# Patient Record
Sex: MALE | Race: WHITE | NOT HISPANIC OR LATINO | Employment: FULL TIME | URBAN - NONMETROPOLITAN AREA
[De-identification: names, ages, dates, MRNs, and addresses within clinical notes are randomized per-mention and may not be internally consistent; named-entity substitution may affect disease eponyms.]

---

## 2017-08-01 ENCOUNTER — TRANSCRIBE ORDERS (OUTPATIENT)
Dept: ADMINISTRATIVE | Facility: HOSPITAL | Age: 47
End: 2017-08-01

## 2017-08-01 DIAGNOSIS — G83.4: Primary | ICD-10-CM

## 2017-08-01 DIAGNOSIS — G95.9 UNSPECIFIED DISEASE OF SPINAL CORD: ICD-10-CM

## 2017-08-08 ENCOUNTER — HOSPITAL ENCOUNTER (OUTPATIENT)
Dept: MRI IMAGING | Facility: HOSPITAL | Age: 47
Discharge: HOME | End: 2017-08-08
Admitting: NURSE PRACTITIONER

## 2017-08-08 ENCOUNTER — HOSPITAL ENCOUNTER (OUTPATIENT)
Dept: MRI IMAGING | Facility: HOSPITAL | Age: 47
Discharge: HOME | End: 2017-08-08

## 2017-08-08 DIAGNOSIS — G95.9 UNSPECIFIED DISEASE OF SPINAL CORD: ICD-10-CM

## 2017-08-08 DIAGNOSIS — G83.4: ICD-10-CM

## 2017-08-08 LAB — CREAT BLDA-MCNC: 1.1 MG/DL (ref 0.6–1.3)

## 2017-08-08 PROCEDURE — 72157 MRI CHEST SPINE W/O & W/DYE: CPT

## 2017-08-08 PROCEDURE — 72158 MRI LUMBAR SPINE W/O & W/DYE: CPT

## 2017-08-08 PROCEDURE — 0 GADOBENATE DIMEGLUMINE 529 MG/ML SOLUTION: Performed by: NURSE PRACTITIONER

## 2017-08-08 PROCEDURE — A9577 INJ MULTIHANCE: HCPCS | Performed by: NURSE PRACTITIONER

## 2017-08-08 PROCEDURE — 82565 ASSAY OF CREATININE: CPT

## 2017-08-08 RX ADMIN — GADOBENATE DIMEGLUMINE 20 ML: 529 INJECTION, SOLUTION INTRAVENOUS at 13:45

## 2019-06-29 ENCOUNTER — HOSPITAL ENCOUNTER (OUTPATIENT)
Dept: GENERAL RADIOLOGY | Facility: HOSPITAL | Age: 49
Discharge: HOME | End: 2019-06-29

## 2019-06-29 ENCOUNTER — HOSPITAL ENCOUNTER (OUTPATIENT)
Dept: GENERAL RADIOLOGY | Facility: HOSPITAL | Age: 49
Discharge: HOME | End: 2019-06-29
Admitting: NURSE PRACTITIONER

## 2019-06-29 DIAGNOSIS — M25.571 ACUTE RIGHT ANKLE PAIN: ICD-10-CM

## 2019-06-29 PROCEDURE — 73610 X-RAY EXAM OF ANKLE: CPT

## 2019-06-29 PROCEDURE — 73630 X-RAY EXAM OF FOOT: CPT

## 2020-08-13 PROCEDURE — U0003 INFECTIOUS AGENT DETECTION BY NUCLEIC ACID (DNA OR RNA); SEVERE ACUTE RESPIRATORY SYNDROME CORONAVIRUS 2 (SARS-COV-2) (CORONAVIRUS DISEASE [COVID-19]), AMPLIFIED PROBE TECHNIQUE, MAKING USE OF HIGH THROUGHPUT TECHNOLOGIES AS DESCRIBED BY CMS-2020-01-R: HCPCS | Performed by: NURSE PRACTITIONER

## 2020-12-16 ENCOUNTER — HOSPITAL ENCOUNTER (OUTPATIENT)
Dept: GENERAL RADIOLOGY | Facility: HOSPITAL | Age: 50
Discharge: HOME | End: 2020-12-16

## 2020-12-16 PROCEDURE — 72110 X-RAY EXAM L-2 SPINE 4/>VWS: CPT

## 2020-12-16 PROCEDURE — 74018 RADEX ABDOMEN 1 VIEW: CPT

## 2021-01-08 PROCEDURE — 87636 SARSCOV2 & INF A&B AMP PRB: CPT | Performed by: NURSE PRACTITIONER

## 2025-01-27 ENCOUNTER — OFFICE VISIT (OUTPATIENT)
Dept: SURGERY | Facility: CLINIC | Age: 55
End: 2025-01-27
Payer: COMMERCIAL

## 2025-01-27 ENCOUNTER — PATIENT ROUNDING (BHMG ONLY) (OUTPATIENT)
Dept: SURGERY | Facility: CLINIC | Age: 55
End: 2025-01-27
Payer: COMMERCIAL

## 2025-01-27 VITALS
WEIGHT: 250.6 LBS | HEART RATE: 74 BPM | DIASTOLIC BLOOD PRESSURE: 82 MMHG | HEIGHT: 73 IN | SYSTOLIC BLOOD PRESSURE: 130 MMHG | BODY MASS INDEX: 33.21 KG/M2 | OXYGEN SATURATION: 97 %

## 2025-01-27 DIAGNOSIS — N50.89 TESTICLE SWELLING: ICD-10-CM

## 2025-01-27 DIAGNOSIS — E66.09 CLASS 1 OBESITY DUE TO EXCESS CALORIES WITH BODY MASS INDEX (BMI) OF 33.0 TO 33.9 IN ADULT, UNSPECIFIED WHETHER SERIOUS COMORBIDITY PRESENT: ICD-10-CM

## 2025-01-27 DIAGNOSIS — E66.811 CLASS 1 OBESITY DUE TO EXCESS CALORIES WITH BODY MASS INDEX (BMI) OF 33.0 TO 33.9 IN ADULT, UNSPECIFIED WHETHER SERIOUS COMORBIDITY PRESENT: ICD-10-CM

## 2025-01-27 DIAGNOSIS — K40.90 RIGHT INGUINAL HERNIA: Primary | ICD-10-CM

## 2025-01-27 PROCEDURE — 99204 OFFICE O/P NEW MOD 45 MIN: CPT | Performed by: STUDENT IN AN ORGANIZED HEALTH CARE EDUCATION/TRAINING PROGRAM

## 2025-01-27 NOTE — PATIENT INSTRUCTIONS

## 2025-01-27 NOTE — PROGRESS NOTES
Office New Patient History and Physical:     Referring Provider: Kwasi Aponte AP*    Chief Complaint   Patient presents with    Hernia       Subjective .     History of present illness:    History of Present Illness  The patient presents for evaluation of an inguinal hernia.    He first observed the inguinal hernia a few weeks ago during a shower. The hernia, located in the middle of his groin area, extending up to his leg, does not cause him discomfort or pain. However, it does cause mild irritation when he engages in running activities. 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 He reports no testicular swelling. His medical history includes an open left inguinal hernia repair with mesh, performed approximately 10 years ago. He reports no other abdominal surgeries and is not currently on any anticoagulant therapy.    SOCIAL HISTORY  He does not smoke.       History  Past Medical History:   Diagnosis Date    Anxiety     Herniated disc, cervical     Hyperlipemia     Hyperlipidemia     Hypertension     Spinal cord cysts    ,   Past Surgical History:   Procedure Laterality Date    INGUINAL HERNIA REPAIR     ,   Family History   Problem Relation Age of Onset    GI problems Mother     Lupus Mother     Neuropathy Mother     Hypertension Mother     No Known Problems Sister     No Known Problems Brother     No Known Problems Sister     No Known Problems Daughter     No Known Problems Daughter     No Known Problems Daughter    ,   Social History     Tobacco Use    Smoking status: Never     Passive exposure: Never    Smokeless tobacco: Never   Vaping Use    Vaping status: Never Used   Substance Use Topics    Alcohol use: No    Drug use: No   , (Not in a hospital admission)   and Allergies:  Patient has no known allergies.    Current Outpatient Medications:     escitalopram (LEXAPRO) 20 MG tablet, Take 1 tablet by mouth Daily., Disp: , Rfl:     metoprolol succinate XL (TOPROL-XL) 50 MG 24 hr  "tablet, Take 1 tablet by mouth Daily., Disp: , Rfl:     rOPINIRole (REQUIP) 0.25 MG tablet, Take 1 tablet by mouth every night at bedtime. (Patient not taking: Reported on 1/27/2025), Disp: , Rfl:         Objective     Vital Signs   /82   Pulse 74   Ht 185.4 cm (73\")   Wt 114 kg (250 lb 9.6 oz)   SpO2 97%   BMI 33.06 kg/m²      Physical Exam:  General appearance - alert, well appearing, and in no distress  Mental status - alert, oriented to person, place, and time  Eyes - pupils equal and reactive, extraocular eye movements intact  Neck - supple, no significant adenopathy  Abdomen - soft, nontender, nondistended, no masses or organomegaly  + small right inguinal hernia. Testicular swelling.   Neurological - alert, oriented, normal speech, no focal findings or movement disorder noted  Physical Exam         Results Review:     The following data was reviewed by: Jessica Tolentino MD on 01/27/2025:  REFERRAL/PRE-AUTH MRN - SCAN - Referral from Dr. Kwasi Aponte (01/20/2025)   GENERAL SURGERY - SCAN - CT ABD/PELVIS - [facility] (01/20/2025)     Assessment & Plan       Diagnoses and all orders for this visit:    1. Right inguinal hernia (Primary)    2. Testicle swelling  -     US Scrotum & Testicles; Future  -     Ambulatory Referral to Urology    3. Class 1 obesity due to excess calories with body mass index (BMI) of 33.0 to 33.9 in adult, unspecified whether serious comorbidity present       Assessment & Plan  1. Right inguinal hernia.  A small fat-containing right inguinal hernia was identified on the CT scan. The hernia is not causing significant discomfort but does irritate slightly during physical activity.  If surgical intervention is required, the right inguinal hernia will be repaired robotically through small incisions during the same procedure as any potential urological procedure to minimize anesthesia exposure.    2. Potential hydrocele.  The patient reported a sensation in the testicle area, " which could be a hydrocele or fluid accumulation. The CT scan indicated some abnormalities in the testicle area. An ultrasound of the testicles will be conducted to confirm the diagnosis. The patient will see JAIME Machuca, for further evaluation after the ultrasound. If the ultrasound confirms a hydrocele, surgical options will be considered by urology.     PROCEDURE  The patient underwent an open left inguinal hernia repair with mesh approximately 10 years ago.     This is a chronic problem (inguinal hernia) and an undiagnosed problem with an uncertain prognosis (testicular swelling). I have reviewed the note above, ordered a referral to urology and an US. I discussed the case with JAIME Machuca.     BMI is >= 30 and <35. (Class 1 Obesity). The following options were offered after discussion;: weight loss educational material (shared in after visit summary)      Jessica Tolentino MD  01/27/25  12:54 CST      Patient or patient representative verbalized consent for the use of Ambient Listening during the visit with  Jessica Tolentino MD for chart documentation. 1/27/2025  12:54 CST

## 2025-01-31 ENCOUNTER — HOSPITAL ENCOUNTER (OUTPATIENT)
Dept: ULTRASOUND IMAGING | Facility: HOSPITAL | Age: 55
Discharge: HOME | End: 2025-01-31
Admitting: STUDENT IN AN ORGANIZED HEALTH CARE EDUCATION/TRAINING PROGRAM
Payer: COMMERCIAL

## 2025-01-31 DIAGNOSIS — N50.89 TESTICLE SWELLING: ICD-10-CM

## 2025-01-31 PROCEDURE — 76870 US EXAM SCROTUM: CPT

## 2025-02-13 NOTE — PROGRESS NOTES
Subjective    Mr. Wood is 54 y.o. male    Chief Complaint: Testicle cyst    History of Present Illness  Patient is a 54-year-old gentleman who was having swelling that showed a right inguinal fat-containing hernia he saw Dr. Tolentino general surgeon and hernia repair was discussed patient then had ultrasound of the scrotum to rule out any scrotal or urologic abnormalities.  The ultrasound showed a left intratesticular cyst and small right hydrocele.  Patient denies any pain or discomfort and has opted not to do anything unless he needs to.    The following portions of the patient's history were reviewed and updated as appropriate: allergies, current medications, past family history, past medical history, past social history, past surgical history and problem list.    Review of Systems   Genitourinary:  Positive for scrotal swelling.         Current Outpatient Medications:     escitalopram (LEXAPRO) 20 MG tablet, Take 1 tablet by mouth Daily., Disp: , Rfl:     metoprolol succinate XL (TOPROL-XL) 50 MG 24 hr tablet, Take 1 tablet by mouth Daily., Disp: , Rfl:     rOPINIRole (REQUIP) 0.25 MG tablet, Take 1 tablet by mouth every night at bedtime. (Patient not taking: Reported on 2/25/2025), Disp: , Rfl:     Past Medical History:   Diagnosis Date    Anxiety     Herniated disc, cervical     Hyperlipemia     Hyperlipidemia     Hypertension     Spinal cord cysts        Past Surgical History:   Procedure Laterality Date    INGUINAL HERNIA REPAIR         Social History     Socioeconomic History    Marital status:    Tobacco Use    Smoking status: Never     Passive exposure: Never    Smokeless tobacco: Never   Vaping Use    Vaping status: Never Used   Substance and Sexual Activity    Alcohol use: No    Drug use: No    Sexual activity: Defer       Family History   Problem Relation Age of Onset    GI problems Mother     Lupus Mother     Neuropathy Mother     Hypertension Mother     No Known Problems Sister     No Known  "Problems Brother     No Known Problems Sister     No Known Problems Daughter     No Known Problems Daughter     No Known Problems Daughter        Objective    Temp 97.7 °F (36.5 °C)   Ht 185.4 cm (73\")   Wt 113 kg (249 lb 9.6 oz)   BMI 32.93 kg/m²     Physical Exam  Constitutional:       Appearance: Normal appearance.   HENT:      Head: Normocephalic and atraumatic.   Pulmonary:      Effort: Pulmonary effort is normal.   Genitourinary:     Testes: Normal.   Skin:     Coloration: Skin is not pale.   Neurological:      Mental Status: He is alert.   Psychiatric:         Mood and Affect: Mood normal.         Behavior: Behavior normal.             Results for orders placed or performed in visit on 02/25/25   POC Urinalysis Dipstick, Multipro    Collection Time: 02/25/25  2:27 PM    Specimen: Urine   Result Value Ref Range    Color Yellow Yellow, Straw, Dark Yellow, Lisset    Clarity, UA Clear Clear    Glucose, UA Negative Negative mg/dL    Bilirubin Negative Negative    Ketones, UA Negative Negative    Specific Gravity  1.030 1.005 - 1.030    Blood, UA Trace (A) Negative    pH, Urine 6.0 5.0 - 8.0    Protein, POC 30 mg/dL (A) Negative mg/dL    Urobilinogen, UA 1 E.U./dL (A) Normal, 0.2 E.U./dL    Nitrite, UA Negative Negative    Leukocytes Negative Negative     Scrotal ultrasound independent review:  I inspected both testicles for any intratesticular masses there is a 5 mm what appears to be intratesticular cyst on the left side.  Right testicle is normal no evidence of infection or torsion.  Small and of fluid around the right testicle.  Assessment and Plan    Diagnoses and all orders for this visit:    1. Groin swelling (Primary)  -     POC Urinalysis Dipstick, Multipro    2. Testicular cyst  -     US Scrotum & Testicles; Future      Patient right inguinal hernia he had seen Dr. Tolentino general surgeon who then had patient get a scrotal ultrasound.  I would like to repeat ultrasound in 3 months to document stability " of the left intratesticular cyst.  No other acute findings I see nothing from a urology standpoint that would indicate a require surgery.

## 2025-02-25 ENCOUNTER — OFFICE VISIT (OUTPATIENT)
Dept: UROLOGY | Facility: CLINIC | Age: 55
End: 2025-02-25
Payer: COMMERCIAL

## 2025-02-25 VITALS — HEIGHT: 73 IN | BODY MASS INDEX: 33.08 KG/M2 | TEMPERATURE: 97.7 F | WEIGHT: 249.6 LBS

## 2025-02-25 DIAGNOSIS — R19.09 GROIN SWELLING: Primary | ICD-10-CM

## 2025-02-25 DIAGNOSIS — N44.2 TESTICULAR CYST: ICD-10-CM

## 2025-02-25 LAB
BILIRUB BLD-MCNC: NEGATIVE MG/DL
CLARITY, POC: CLEAR
COLOR UR: YELLOW
GLUCOSE UR STRIP-MCNC: NEGATIVE MG/DL
KETONES UR QL: NEGATIVE
LEUKOCYTE EST, POC: NEGATIVE
NITRITE UR-MCNC: NEGATIVE MG/ML
PH UR: 6 [PH] (ref 5–8)
PROT UR STRIP-MCNC: ABNORMAL MG/DL
RBC # UR STRIP: ABNORMAL /UL
SP GR UR: 1.03 (ref 1–1.03)
UROBILINOGEN UR QL: ABNORMAL

## 2025-05-28 ENCOUNTER — HOSPITAL ENCOUNTER (OUTPATIENT)
Dept: ULTRASOUND IMAGING | Facility: HOSPITAL | Age: 55
Discharge: HOME | End: 2025-05-28
Admitting: PHYSICIAN ASSISTANT
Payer: COMMERCIAL

## 2025-05-28 DIAGNOSIS — N44.2 TESTICULAR CYST: ICD-10-CM

## 2025-05-28 PROCEDURE — 76870 US EXAM SCROTUM: CPT

## 2025-05-28 NOTE — PROGRESS NOTES
Subjective    Mr. Wood is 54 y.o. male    Chief Complaint: Groin Swelling    History of Present Illness  Patient was referred to us by Dr. Tolentino due to scrotal ultrasound findings he was being evaluated for right inguinal hernia.  The ultrasound showed a intratesticular cyst on the left.  Bilateral epididymal cysts.  I examined him and after reviewing the first ultrasound did not see anything that looked like a testicular mass but I wanted him to return today with ultrasound to assess for stability. For ultrasound findings see below.  Patient also has had some right testicular discomfort and what he perceives as groin swelling.    The following portions of the patient's history were reviewed and updated as appropriate: allergies, current medications, past family history, past medical history, past social history, past surgical history and problem list.    Review of Systems   Genitourinary:  Positive for scrotal swelling and testicular pain.         Current Outpatient Medications:     escitalopram (LEXAPRO) 20 MG tablet, Take 1 tablet by mouth Daily., Disp: , Rfl:     metoprolol succinate XL (TOPROL-XL) 50 MG 24 hr tablet, Take 1 tablet by mouth Daily., Disp: , Rfl:     rOPINIRole (REQUIP) 0.25 MG tablet, Take 1 tablet by mouth every night at bedtime., Disp: , Rfl:     naproxen (Naprosyn) 500 MG tablet, Take 1 tablet by mouth 2 (Two) Times a Day With Meals for 20 days., Disp: 40 tablet, Rfl: 0    Past Medical History:   Diagnosis Date    Anxiety     Herniated disc, cervical     Hyperlipemia     Hyperlipidemia     Hypertension     Spinal cord cysts        Past Surgical History:   Procedure Laterality Date    INGUINAL HERNIA REPAIR         Social History     Socioeconomic History    Marital status:    Tobacco Use    Smoking status: Never     Passive exposure: Never    Smokeless tobacco: Never   Vaping Use    Vaping status: Never Used   Substance and Sexual Activity    Alcohol use: No    Drug use: No     "Sexual activity: Defer       Family History   Problem Relation Age of Onset    GI problems Mother     Lupus Mother     Neuropathy Mother     Hypertension Mother     No Known Problems Sister     No Known Problems Brother     No Known Problems Sister     No Known Problems Daughter     No Known Problems Daughter     No Known Problems Daughter        Objective    Temp 97.2 °F (36.2 °C)   Ht 185.4 cm (73\")   Wt 114 kg (251 lb 9.6 oz)   BMI 33.19 kg/m²     Physical Exam  Constitutional:       Appearance: Normal appearance.   HENT:      Head: Normocephalic and atraumatic.   Pulmonary:      Effort: Pulmonary effort is normal.   Genitourinary:     Comments: No external cellulitis or erythema bilateral testicles reveal no abnormalities with palpation and the left testicular cyst is nonpalpable.  Small epididymal cyst noted.  Some discomfort on the right.  Skin:     Coloration: Skin is not pale.   Neurological:      Mental Status: He is alert.   Psychiatric:         Mood and Affect: Mood normal.         Behavior: Behavior normal.             Results for orders placed or performed in visit on 05/27/25   POC Urinalysis Dipstick, Multipro    Collection Time: 05/29/25  8:10 AM    Specimen: Urine   Result Value Ref Range    Color Yellow Yellow, Straw, Dark Yellow, Lisset    Clarity, UA Clear Clear    Glucose, UA Negative Negative mg/dL    Bilirubin Negative Negative    Ketones, UA Negative Negative    Specific Gravity  1.020 1.005 - 1.030    Blood, UA Trace (A) Negative    pH, Urine 6.0 5.0 - 8.0    Protein, POC Negative Negative mg/dL    Urobilinogen, UA Normal Normal, 0.2 E.U./dL    Nitrite, UA Negative Negative    Leukocytes Negative Negative     Scrotal ultrasound independent review:  I reviewed and examined the images of the ultrasound there were no suspicious solid testicular or epididymal masses.  No significant change in the 6 mm left intratesticular cyst.  Small bilateral epididymal cysts and small bilateral " hydroceles  Assessment and Plan    Diagnoses and all orders for this visit:    1. Scrotal pain (Primary)  -     naproxen (Naprosyn) 500 MG tablet; Take 1 tablet by mouth 2 (Two) Times a Day With Meals for 20 days.  Dispense: 40 tablet; Refill: 0    2. Testicular cyst      Scrotal support ice as needed can take an anti-inflammatory.  No evidence of infection no acute findings on the ultrasound to warrant to indicate any other treatment or surgery.    The left intratesticular cyst is stable in appearance.    Patient can return on an as-needed basis

## 2025-05-29 ENCOUNTER — OFFICE VISIT (OUTPATIENT)
Dept: UROLOGY | Facility: CLINIC | Age: 55
End: 2025-05-29
Payer: COMMERCIAL

## 2025-05-29 VITALS — HEIGHT: 73 IN | BODY MASS INDEX: 33.34 KG/M2 | TEMPERATURE: 97.2 F | WEIGHT: 251.6 LBS

## 2025-05-29 DIAGNOSIS — N44.2 TESTICULAR CYST: ICD-10-CM

## 2025-05-29 DIAGNOSIS — N50.82 SCROTAL PAIN: Primary | ICD-10-CM

## 2025-05-29 RX ORDER — NAPROXEN 500 MG/1
500 TABLET ORAL 2 TIMES DAILY WITH MEALS
Qty: 40 TABLET | Refills: 0 | Status: SHIPPED | OUTPATIENT
Start: 2025-05-29 | End: 2025-06-18